# Patient Record
(demographics unavailable — no encounter records)

---

## 2024-12-09 NOTE — ASSESSMENT
[FreeTextEntry1] : Spirometry is normal  Continue Breo daily and Albuterol as needed  JOLEEN - high likelihood - in lab sleep study ordered  Preop clearance - moderate risk surgery, low risk patient 2 months follow up

## 2024-12-09 NOTE — HISTORY OF PRESENT ILLNESS
[TextBox_4] : 43 year old woman, former remote smoker, known to have asthma mostly exercise related symptoms, possible JOLEEN presenting for evaluation and preop clearance prior to hysterectomy.  She is on Breo daily; And PRN Albuterol which she rarely needs.  She recently had pneumonia but has no wheezing on exam.  She snores, has daytime sleepiness and sleep related apneas.

## 2025-01-17 NOTE — HISTORY OF PRESENT ILLNESS
[FreeTextEntry1] : Language: English Accompanied by: partner Contact info: [phone] Referring Provider/PCP: Hospital f/u   Initial H&P 01/17/2025: Ms. DIEHL is a very pleasant 43F who presents today for hospital f/u after r/o bladder injury.   Underwent robotic TAHBSO on 1/6/25.  Cystoscopy at the end showed two pale areas in the bladder that were c/f thermal injury.  There were no defects on the outside, and no repair was indicated based on assessment in the OR.    Given the concern, she was recommended to have ronquillo catheter for 10d and to proceed with CT cystogram prior to removal.    Cystogram was done earlier today. I personally reviewed the images, and I did not identify any extravasation or concern for injury. Catheter was not removed at hospital, therefore will undergo catheter removal in office today.   Pt also states today that she has had some irritation and foul odor c/f yeast infection. Has had similar symptoms in the past a/w yeast infection.   Of note, she had lap choly approx 4y ago, and while ronquillo catheter was in place, she had sudden onset GH. Had w/u including cysto with Dr. Godinez, and had no concerning findings.  --------------------------------------------------------------------------------------------------------------------------------------- PMHx: Asthma, Uterine Fibroids, HLD, JOLEEN PSHx: Lap CCY, Robotic TAHBSO,  SHx: denies x3  All: NKDA  14pt ROS neg other than HPI. --------------------------------------------------------------------------------------------------------------------------------------- Physical Exam: Chaperone: Pt's wife General: NAD, sitting on exam table comfortably HEENT: NCAT, EOMI Resp: breathing comfortably on RA, b/l symm chest rise Back: (-) CVAT b/l MSK: RICARDO w/ FROM Psych: appropriate affect : 16Fr ronquillo in place draining CYU to overnight bag  --------------------------------------------------------------------------------------------------------------------------------------- Results: CT Cystogram 01/17/25 (Michelle): neg for extravasation  --------------------------------------------------------------------------------------------------------------------------------------- A/P: 43F with c/f bladder injury in the OR last week, negative CT cystogram today. Also c/o possible yeast infection.   Ronquillo catheter was removed in the office today without issue.   1. Bladder Injury Results of CT Cystogram were discussed with pt today.  Catheter was removed due to no abnormalities noted on CT.  Discussed that abnormal finding on cysto in the OR may be related to her prior GH episode and inflammatory changes within the bladder from the prior catheter.    No additional workup or management recs at this time.  Counseled on retention monitoring.    2. Yeast Infection Recommended PO fluconazole x3d.    I have answered all of her questions today, and I will see her for f/u PRN.   Plan: - rtc prn  I spent a total of 32 minutes on face-to-face counseling, coordination of care, review of prior records and clinical documentation.

## 2025-03-26 NOTE — ASSESSMENT
[FreeTextEntry1] : Asthma is well-controlled Spirometry was normal Continue Breo 100 daily and albuterol as needed No recent exacerbation or ER visits On Xyzal as needed for seasonal allergies Lungs are clear on exam today  Obstructive sleep apnea Home sleep study with evidence of mild JOLEEN and AHI of 7 Evidence of O2 desaturation noted with the lowest O2 saturation 86% Home sleep study can underestimate the severity of JOLEEN She also has daytime sleepiness,feels unrefreshed in the morning, and is known to snore Given the presence of symptoms I recommended treatment with APAP APAP 6 to 18 cm of water with a facemask ordered today  2 months follow-up for compliance and residual AHI

## 2025-03-26 NOTE — HISTORY OF PRESENT ILLNESS
[TextBox_4] : 43 year old woman, former remote smoker, known to have asthma mostly exercise related symptoms, possible JOLEEN presenting for evaluation and preop clearance prior to hysterectomy.  She is on Breo daily; And PRN Albuterol which she rarely needs.  She recently had pneumonia but has no wheezing on exam.  She snores, has daytime sleepiness and sleep related apneas.   3/26/2025: Sleep study reviewed today with evidence of mild sleep apnea on the home study however there are instances of O2 desaturation as well.  She her asthma is well-controlled on Breo 100 daily and as needed albuterol which she rarely needs.  She is taking Xyzal for seasonal allergies.  Lungs are clear today on exam.

## 2025-05-27 NOTE — ASSESSMENT
[FreeTextEntry1] : Asthma is well-controlled Spirometry was normal Continue Breo 100 daily and albuterol as needed No recent exacerbation or ER visits On Xyzal as needed for seasonal allergies Lungs are clear on exam today  Obstructive sleep apnea Home sleep study with evidence of mild JOLEEN and AHI of 7; daytime sleepiness APAP ordered and delivered The face mask is too big Small facemask ordered today  3 months follow-up

## 2025-05-27 NOTE — HISTORY OF PRESENT ILLNESS
[TextBox_4] : 43 year old woman, former remote smoker, known to have asthma mostly exercise related symptoms, possible JOLEEN presenting for evaluation and preop clearance prior to hysterectomy.  She is on Breo daily; And PRN Albuterol which she rarely needs.  She recently had pneumonia but has no wheezing on exam.  She snores, has daytime sleepiness and sleep related apneas.   5/27/2025: The facemask she has is too big and we will order a small facemask today.  Otherwise asthma has been well-controlled on Breo 100 daily.  She also has postnasal drip and she takes Flonase and Xyzal as needed.  She uses albuterol maybe once a week.  No recent exacerbations requiring systemic steroids or hospital admission.